# Patient Record
Sex: MALE | Race: WHITE | NOT HISPANIC OR LATINO | Employment: FULL TIME | URBAN - METROPOLITAN AREA
[De-identification: names, ages, dates, MRNs, and addresses within clinical notes are randomized per-mention and may not be internally consistent; named-entity substitution may affect disease eponyms.]

---

## 2019-07-08 ENCOUNTER — TELEPHONE (OUTPATIENT)
Dept: INTERNAL MEDICINE | Facility: CLINIC | Age: 34
End: 2019-07-08

## 2019-07-08 ENCOUNTER — OFFICE VISIT (OUTPATIENT)
Dept: INTERNAL MEDICINE | Facility: CLINIC | Age: 34
End: 2019-07-08
Payer: COMMERCIAL

## 2019-07-08 ENCOUNTER — HOSPITAL ENCOUNTER (OUTPATIENT)
Dept: RADIOLOGY | Facility: HOSPITAL | Age: 34
Discharge: HOME | End: 2019-07-08
Attending: FAMILY MEDICINE
Payer: COMMERCIAL

## 2019-07-08 VITALS
OXYGEN SATURATION: 97 % | HEART RATE: 80 BPM | DIASTOLIC BLOOD PRESSURE: 70 MMHG | TEMPERATURE: 98 F | SYSTOLIC BLOOD PRESSURE: 102 MMHG | HEIGHT: 69 IN | BODY MASS INDEX: 23.97 KG/M2 | WEIGHT: 161.81 LBS

## 2019-07-08 DIAGNOSIS — M54.2 NECK PAIN ON RIGHT SIDE: ICD-10-CM

## 2019-07-08 DIAGNOSIS — M25.511 ACUTE PAIN OF RIGHT SHOULDER: ICD-10-CM

## 2019-07-08 DIAGNOSIS — J02.9 EXUDATIVE PHARYNGITIS: Primary | ICD-10-CM

## 2019-07-08 DIAGNOSIS — J02.9 EXUDATIVE PHARYNGITIS: ICD-10-CM

## 2019-07-08 LAB
DEPRECATED S PYO AG THROAT QL EIA: NEGATIVE
INFLUENZA A, MOLECULAR: NEGATIVE
INFLUENZA B, MOLECULAR: NEGATIVE
SPECIMEN SOURCE: NORMAL

## 2019-07-08 PROCEDURE — 71046 X-RAY EXAM CHEST 2 VIEWS: CPT | Mod: 26,,, | Performed by: RADIOLOGY

## 2019-07-08 PROCEDURE — 87880 STREP A ASSAY W/OPTIC: CPT

## 2019-07-08 PROCEDURE — 99999 PR PBB SHADOW E&M-EST. PATIENT-LVL III: CPT | Mod: PBBFAC,,, | Performed by: FAMILY MEDICINE

## 2019-07-08 PROCEDURE — 3008F PR BODY MASS INDEX (BMI) DOCUMENTED: ICD-10-PCS | Mod: CPTII,S$GLB,, | Performed by: FAMILY MEDICINE

## 2019-07-08 PROCEDURE — 99214 OFFICE O/P EST MOD 30 MIN: CPT | Mod: S$GLB,,, | Performed by: FAMILY MEDICINE

## 2019-07-08 PROCEDURE — 72050 X-RAY EXAM NECK SPINE 4/5VWS: CPT | Mod: TC

## 2019-07-08 PROCEDURE — 99214 PR OFFICE/OUTPT VISIT, EST, LEVL IV, 30-39 MIN: ICD-10-PCS | Mod: S$GLB,,, | Performed by: FAMILY MEDICINE

## 2019-07-08 PROCEDURE — 73030 XR SHOULDER COMPLETE 2 OR MORE VIEWS RIGHT: ICD-10-PCS | Mod: 26,RT,, | Performed by: RADIOLOGY

## 2019-07-08 PROCEDURE — 73030 X-RAY EXAM OF SHOULDER: CPT | Mod: 26,RT,, | Performed by: RADIOLOGY

## 2019-07-08 PROCEDURE — 87502 INFLUENZA DNA AMP PROBE: CPT

## 2019-07-08 PROCEDURE — 72050 XR CERVICAL SPINE COMPLETE 5 VIEW: ICD-10-PCS | Mod: 26,,, | Performed by: RADIOLOGY

## 2019-07-08 PROCEDURE — 72050 X-RAY EXAM NECK SPINE 4/5VWS: CPT | Mod: 26,,, | Performed by: RADIOLOGY

## 2019-07-08 PROCEDURE — 73030 X-RAY EXAM OF SHOULDER: CPT | Mod: TC,RT

## 2019-07-08 PROCEDURE — 3008F BODY MASS INDEX DOCD: CPT | Mod: CPTII,S$GLB,, | Performed by: FAMILY MEDICINE

## 2019-07-08 PROCEDURE — 87081 CULTURE SCREEN ONLY: CPT

## 2019-07-08 PROCEDURE — 71046 XR CHEST PA AND LATERAL: ICD-10-PCS | Mod: 26,,, | Performed by: RADIOLOGY

## 2019-07-08 PROCEDURE — 71046 X-RAY EXAM CHEST 2 VIEWS: CPT | Mod: TC

## 2019-07-08 PROCEDURE — 99999 PR PBB SHADOW E&M-EST. PATIENT-LVL III: ICD-10-PCS | Mod: PBBFAC,,, | Performed by: FAMILY MEDICINE

## 2019-07-08 RX ORDER — IBUPROFEN 800 MG/1
800 TABLET ORAL 3 TIMES DAILY
Refills: 0 | COMMUNITY
Start: 2019-07-06

## 2019-07-08 RX ORDER — PREDNISONE 10 MG/1
TABLET ORAL
Qty: 18 TABLET | Refills: 0 | Status: SHIPPED | OUTPATIENT
Start: 2019-07-08

## 2019-07-08 NOTE — TELEPHONE ENCOUNTER
----- Message from Ritu Bradford sent at 7/8/2019  3:54 PM CDT -----  Contact: Pt can be reached at [phone]  Pt is waiting at the pharmacy for prescription for antibotics.  Pt is at [employer] phone [phone]    Thank you!

## 2019-07-08 NOTE — TELEPHONE ENCOUNTER
----- Message from Corinne Ortiz sent at 7/8/2019  9:29 AM CDT -----  Contact: self  Type:  Same Day Appointment    patient request Dr Machuca states seen Dr Machuca in the past at [facility]   Patient states experiencing neck and back pain need appointment for today.  Name of Caller:  Patient   When is the first available appointment?  Symptoms: neck and back pain  Would the patient rather a call back or a response via [facility]? call  Best Call Back Number:[phone]  Additional Information:

## 2019-07-08 NOTE — TELEPHONE ENCOUNTER
Pt states that he has been having shoulder and back pain. Went to urgent care clinic, received a steroid shot and passed out. He was also told he had the flu. He states that his shoulder is still bothering him, does not have any fever, sore throat a little. Would like to know what are  Some recommendations or does he need to be seen. Please advise.

## 2019-07-08 NOTE — PROGRESS NOTES
Darshan Gonzalez  07/08/2019  [id number]    Nick Machuca MD  Patient Care Team:  Nick Machuca MD as PCP - General (Family Medicine)        Chief Complaint:  Chief Complaint   Patient presents with    Neck Pain     radiates down shoulder and back       History of Present Illness:  This generally healthy 33-year-old male long-time patient of mine comes in today and says that a week ago he injured his right shoulder lifting weights but it improved over the next few days until he lifted a generator about 4 days ago and re-injured the right shoulder and also the right neck started hurting as well.  Two days ago it was very severe and he had to go to urgent care where they gave him 2 injections.  He thinks 1 was nor flex and and other 1 was Toradol.  He was sitting on the exam table after the injections and had a syncopal reaction associated with hypotension and hit his face and head and his laceration on the left forehead was glued.  The patient thinks he was fairly well-hydrated at the time and has never had any type of vasovagal faint before.    He has not had headaches since that time does not know of any lingering problem such as memory loss and his fever has abated in the last 24 hr.  He still has some soreness on the right neck and upper shoulder region and some stiffness in turning his neck to the right and in full extension of the neck but it is improved from a few days ago.  He does not have any radiation down the arm.  He does noticed painful sore throat on the right side and also swollen tender nodes on the right side of the neck.    He was told in the urgent care initially that he did not have a positive flu test but before he left he was told he did have a positive flu test and was given 2 pills to take at the time.    History:  History reviewed. No pertinent past medical history.  History reviewed. No pertinent surgical history.  Family History   Problem Relation Age of Onset    Mitral valve  prolapse Mother      Social History     Socioeconomic History    Marital status:      Spouse name: Not on file    Number of children: Not on file    Years of education: Not on file    Highest education level: Not on file   Occupational History    Not on file   Social Needs    Financial resource strain: Not on file    Food insecurity:     Worry: Not on file     Inability: Not on file    Transportation needs:     Medical: Not on file     Non-medical: Not on file   Tobacco Use    Smoking status: Never Smoker    Smokeless tobacco: Never Used   Substance and Sexual Activity    Alcohol use: Not Currently    Drug use: Not on file    Sexual activity: Not on file   Lifestyle    Physical activity:     Days per week: Not on file     Minutes per session: Not on file    Stress: Not on file   Relationships    Social connections:     Talks on phone: Not on file     Gets together: Not on file     Attends Sabianism service: Not on file     Active member of club or organization: Not on file     Attends meetings of clubs or organizations: Not on file     Relationship status: Not on file   Other Topics Concern    Not on file   Social History Narrative    Not on file     There is no problem list on file for this patient.    Review of patient's allergies indicates:   Allergen Reactions    Norflex [orphenadrine citrate]     Toradol [ketorolac]        The following were reviewed at this visit: active problem list, medication list, allergies, family history, social history, and health maintenance.    Medications:  Current Outpatient Medications on File Prior to Visit   Medication Sig Dispense Refill    ibuprofen (ADVIL,MOTRIN) 800 MG tablet Take 800 mg by mouth 3 (three) times daily.  0     No current facility-administered medications on file prior to visit.        Medications have been reviewed and reconciled with patient at this visit.      Exam:  Wt Readings from Last 3 Encounters:   07/08/19 73.4 kg (161 lb  13.1 oz)     Temp Readings from Last 3 Encounters:   07/08/19 97.8 °F (36.6 °C) (Tympanic)     BP Readings from Last 3 Encounters:   07/08/19 102/70     Pulse Readings from Last 3 Encounters:   07/08/19 80     Body mass index is 23.9 kg/m².      Review of Systems   Constitutional: Negative for chills, fever and weight loss.   HENT: Positive for sore throat.    Respiratory: Negative for shortness of breath.    Cardiovascular: Negative for chest pain and palpitations.   Musculoskeletal: Positive for neck pain.     Physical Exam the patient is alert and oriented well-nourished well-developed ambulatory and in no acute distress but does have a few healing abrasions on the left facial region and a larger 3 cm healing laceration of the left forehead.    HEENT-ear canals clear, TMs intact  Pharynx-heavy whitish exudate noted posterior pharynx    Neck-tender swollen right anterior cervical and submandibular nodes.  Neck rotation to the right is only  70° and the patient has only 30° of extension.    Right shoulder-good strength and good range of motion except for pain with full external rotation.  Empty can test is normal with good strength  Tenderness to palpation along the medial border of the scapula at the rhomboids and along the superior trapezius musculature and along the right paracervical musculature.   strength is good bilaterally    Discussion-the patient apparently had a muscle strain of the right shoulder a week ago and then re-injured it and also the right paracervical musculature.  Concomitantly over the past 4 days he has had some type of febrile illness including right pharyngeal exudate with lymphadenopathy.        Darshan was seen today for neck pain.    Diagnoses and all orders for this visit:    Exudative pharyngitis  -     X-Ray Chest PA And Lateral  -     X-Ray Cervical Spine Complete 5 view; Future  -     X-ray Shoulder 2 or More Views Right; Future  -     CBC auto differential; Future  -      Influenza A & B by Molecular  -     Throat Screen, Rapid  -     Heterophile antibody titer; Future    Acute pain of right shoulder  -     X-Ray Chest PA And Lateral  -     X-Ray Cervical Spine Complete 5 view; Future  -     X-ray Shoulder 2 or More Views Right; Future  -     CBC auto differential; Future  -     Influenza A & B by Molecular  -     Throat Screen, Rapid    Neck pain on right side  -     X-Ray Chest PA And Lateral  -     X-Ray Cervical Spine Complete 5 view; Future  -     X-ray Shoulder 2 or More Views Right; Future  -     CBC auto differential; Future  -     Influenza A & B by Molecular  -     Throat Screen, Rapid    Other orders  -     Strep A culture, throat                  Follow up: No follow-ups on file.    After visit summary was printed and given to patient upon discharge today.  Patient goals and care plan are included in After Visit Summary.

## 2019-07-09 ENCOUNTER — TELEPHONE (OUTPATIENT)
Dept: INTERNAL MEDICINE | Facility: CLINIC | Age: 34
End: 2019-07-09

## 2019-07-09 NOTE — TELEPHONE ENCOUNTER
----- Message from Josselyn Edwards sent at 7/9/2019  8:30 AM CDT -----  Contact: Pt  .Type:  Patient Returning Call    Who Called: Pt  Who Left Message for Patient: Nurse   Does the patient know what this is regarding?: return call   Would the patient rather a call back or a response via [facility]? Call back  Best Call Back Number: [phone]  Additional Information:

## 2019-07-10 ENCOUNTER — TELEPHONE (OUTPATIENT)
Dept: ORTHOPEDICS | Facility: CLINIC | Age: 34
End: 2019-07-10

## 2019-07-10 NOTE — TELEPHONE ENCOUNTER
Attempted to reach out to the patient in reference to his CBC results per Dr. Machuca. No answer. Left voice message.//DG      ----- Message from Nick Machuca MD sent at 7/9/2019  2:19 PM CDT -----  CBC is fine-no significant anemia or indication of severe infection

## 2019-07-11 ENCOUNTER — TELEPHONE (OUTPATIENT)
Dept: INTERNAL MEDICINE | Facility: CLINIC | Age: 34
End: 2019-07-11

## 2019-07-11 LAB — BACTERIA THROAT CULT: NORMAL

## 2019-07-11 NOTE — TELEPHONE ENCOUNTER
----- Message from Eileen Jackson sent at 7/11/2019  9:40 AM CDT -----  Contact: Pt  Pt missed a call from the Dr's Office and would like a return call from the nurse at [phone]

## 2019-09-10 ENCOUNTER — OFFICE VISIT (OUTPATIENT)
Dept: INTERNAL MEDICINE | Facility: CLINIC | Age: 34
End: 2019-09-10
Payer: COMMERCIAL

## 2019-09-10 ENCOUNTER — LAB VISIT (OUTPATIENT)
Dept: LAB | Facility: HOSPITAL | Age: 34
End: 2019-09-10
Attending: FAMILY MEDICINE
Payer: COMMERCIAL

## 2019-09-10 VITALS
HEIGHT: 69 IN | OXYGEN SATURATION: 97 % | TEMPERATURE: 97 F | DIASTOLIC BLOOD PRESSURE: 70 MMHG | HEART RATE: 88 BPM | WEIGHT: 162.06 LBS | BODY MASS INDEX: 24 KG/M2 | SYSTOLIC BLOOD PRESSURE: 108 MMHG

## 2019-09-10 DIAGNOSIS — F41.9 ANXIETY: ICD-10-CM

## 2019-09-10 DIAGNOSIS — F41.0 PANIC DISORDER: ICD-10-CM

## 2019-09-10 DIAGNOSIS — F41.9 ANXIETY: Primary | ICD-10-CM

## 2019-09-10 DIAGNOSIS — J02.9 EXUDATIVE PHARYNGITIS: ICD-10-CM

## 2019-09-10 LAB
ALBUMIN SERPL BCP-MCNC: 4.2 G/DL (ref 3.5–5.2)
ALP SERPL-CCNC: 69 U/L (ref 55–135)
ALT SERPL W/O P-5'-P-CCNC: 21 U/L (ref 10–44)
ANION GAP SERPL CALC-SCNC: 10 MMOL/L (ref 8–16)
AST SERPL-CCNC: 23 U/L (ref 10–40)
BASOPHILS # BLD AUTO: 0.06 K/UL (ref 0–0.2)
BASOPHILS NFR BLD: 0.9 % (ref 0–1.9)
BILIRUB SERPL-MCNC: 0.5 MG/DL (ref 0.1–1)
BUN SERPL-MCNC: 12 MG/DL (ref 6–20)
CALCIUM SERPL-MCNC: 9.6 MG/DL (ref 8.7–10.5)
CHLORIDE SERPL-SCNC: 101 MMOL/L (ref 95–110)
CHOLEST SERPL-MCNC: 154 MG/DL (ref 120–199)
CHOLEST/HDLC SERPL: 2.2 {RATIO} (ref 2–5)
CO2 SERPL-SCNC: 28 MMOL/L (ref 23–29)
CREAT SERPL-MCNC: 1.3 MG/DL (ref 0.5–1.4)
DIFFERENTIAL METHOD: ABNORMAL
EOSINOPHIL # BLD AUTO: 0.7 K/UL (ref 0–0.5)
EOSINOPHIL NFR BLD: 9.6 % (ref 0–8)
ERYTHROCYTE [DISTWIDTH] IN BLOOD BY AUTOMATED COUNT: 12.1 % (ref 11.5–14.5)
EST. GFR  (AFRICAN AMERICAN): >60 ML/MIN/1.73 M^2
EST. GFR  (NON AFRICAN AMERICAN): >60 ML/MIN/1.73 M^2
GLUCOSE SERPL-MCNC: 91 MG/DL (ref 70–110)
HCT VFR BLD AUTO: 44.2 % (ref 40–54)
HDLC SERPL-MCNC: 69 MG/DL (ref 40–75)
HDLC SERPL: 44.8 % (ref 20–50)
HGB BLD-MCNC: 14.5 G/DL (ref 14–18)
IMM GRANULOCYTES # BLD AUTO: 0.03 K/UL (ref 0–0.04)
IMM GRANULOCYTES NFR BLD AUTO: 0.4 % (ref 0–0.5)
LDLC SERPL CALC-MCNC: 73.8 MG/DL (ref 63–159)
LYMPHOCYTES # BLD AUTO: 2.1 K/UL (ref 1–4.8)
LYMPHOCYTES NFR BLD: 31.1 % (ref 18–48)
MCH RBC QN AUTO: 29 PG (ref 27–31)
MCHC RBC AUTO-ENTMCNC: 32.8 G/DL (ref 32–36)
MCV RBC AUTO: 88 FL (ref 82–98)
MONOCYTES # BLD AUTO: 0.6 K/UL (ref 0.3–1)
MONOCYTES NFR BLD: 8.7 % (ref 4–15)
NEUTROPHILS # BLD AUTO: 3.4 K/UL (ref 1.8–7.7)
NEUTROPHILS NFR BLD: 49.3 % (ref 38–73)
NONHDLC SERPL-MCNC: 85 MG/DL
NRBC BLD-RTO: 0 /100 WBC
PLATELET # BLD AUTO: 279 K/UL (ref 150–350)
PMV BLD AUTO: 10.1 FL (ref 9.2–12.9)
POTASSIUM SERPL-SCNC: 4.3 MMOL/L (ref 3.5–5.1)
PROT SERPL-MCNC: 7.3 G/DL (ref 6–8.4)
RBC # BLD AUTO: 5 M/UL (ref 4.6–6.2)
SODIUM SERPL-SCNC: 139 MMOL/L (ref 136–145)
TRIGL SERPL-MCNC: 56 MG/DL (ref 30–150)
TSH SERPL DL<=0.005 MIU/L-ACNC: 0.62 UIU/ML (ref 0.4–4)
WBC # BLD AUTO: 6.79 K/UL (ref 3.9–12.7)

## 2019-09-10 PROCEDURE — 99999 PR PBB SHADOW E&M-EST. PATIENT-LVL III: CPT | Mod: PBBFAC,,, | Performed by: FAMILY MEDICINE

## 2019-09-10 PROCEDURE — 36415 COLL VENOUS BLD VENIPUNCTURE: CPT

## 2019-09-10 PROCEDURE — 3008F BODY MASS INDEX DOCD: CPT | Mod: CPTII,S$GLB,, | Performed by: FAMILY MEDICINE

## 2019-09-10 PROCEDURE — 99214 PR OFFICE/OUTPT VISIT, EST, LEVL IV, 30-39 MIN: ICD-10-PCS | Mod: S$GLB,,, | Performed by: FAMILY MEDICINE

## 2019-09-10 PROCEDURE — 99999 PR PBB SHADOW E&M-EST. PATIENT-LVL III: ICD-10-PCS | Mod: PBBFAC,,, | Performed by: FAMILY MEDICINE

## 2019-09-10 PROCEDURE — 99214 OFFICE O/P EST MOD 30 MIN: CPT | Mod: S$GLB,,, | Performed by: FAMILY MEDICINE

## 2019-09-10 PROCEDURE — 3008F PR BODY MASS INDEX (BMI) DOCUMENTED: ICD-10-PCS | Mod: CPTII,S$GLB,, | Performed by: FAMILY MEDICINE

## 2019-09-10 PROCEDURE — 84443 ASSAY THYROID STIM HORMONE: CPT

## 2019-09-10 PROCEDURE — 85025 COMPLETE CBC W/AUTO DIFF WBC: CPT

## 2019-09-10 PROCEDURE — 80053 COMPREHEN METABOLIC PANEL: CPT

## 2019-09-10 PROCEDURE — 86308 HETEROPHILE ANTIBODY SCREEN: CPT

## 2019-09-10 PROCEDURE — 80061 LIPID PANEL: CPT

## 2019-09-10 RX ORDER — ESCITALOPRAM OXALATE 10 MG/1
TABLET ORAL
Qty: 30 TABLET | Refills: 1 | Status: SHIPPED | OUTPATIENT
Start: 2019-09-10 | End: 2019-11-04 | Stop reason: SDUPTHER

## 2019-09-10 NOTE — PATIENT INSTRUCTIONS
Stress management techniques    Start taking Lexapro 1/2 each evening until return from vacation and then increase to 1 tab each evening    Work on increasing hobby time    Get labs done fasting

## 2019-09-10 NOTE — PROGRESS NOTES
Darshan Gonzalez  09/10/2019  [id number]    Nick Machuca MD  Patient Care Team:  Nick Machuca MD as PCP - General (Family Medicine)        Chief Complaint:  Chief Complaint   Patient presents with    Anxiety     been going on for awhile now. he has already seeked help as far as therapy and no real change       History of Present Illness:   Darshan Gonzalez 33 y.o. male presents today with history of long-term anxiety which has been worsening over the last year .  He states that he remembers having anxiety growing up and particularly in high school but he was able to control it by working out and exercising.    He states however over the last year it has become more troublesome and he worries a lot about his wife and his child and feels like he is unable to control things from a safety standpoint.  He exercises 4-5 days a week but says he even gets anxious about his workouts and especially if he has to miss 1.  He has a job which  is fairly sedentary and he gets very agitated at times because he cannot move around very much.    He was referred to a psychiatrist or psychologist several months ago and says that he went for about 8 visits and learned stress management techniques and was told he might have  OCD but does not feel that this overall improved his situation very much.    The patient has good insight and realized a few months ago when he started having nonexertional chest pain and panic attack that it might be because his mother  at his age of a sudden cardiac cardiovascular event.  He saw cardiologist had evaluations done which were negative.    The patient comes in today asking for help with management of this problem.      History:  History reviewed. No pertinent past medical history.  History reviewed. No pertinent surgical history.  Family History   Problem Relation Age of Onset    Mitral valve prolapse Mother      Social History     Socioeconomic History    Marital status:       Spouse name: Not on file    Number of children: Not on file    Years of education: Not on file    Highest education level: Not on file   Occupational History    Not on file   Social Needs    Financial resource strain: Not on file    Food insecurity:     Worry: Not on file     Inability: Not on file    Transportation needs:     Medical: Not on file     Non-medical: Not on file   Tobacco Use    Smoking status: Never Smoker    Smokeless tobacco: Never Used   Substance and Sexual Activity    Alcohol use: Not Currently    Drug use: Not on file    Sexual activity: Not on file   Lifestyle    Physical activity:     Days per week: Not on file     Minutes per session: Not on file    Stress: Not on file   Relationships    Social connections:     Talks on phone: Not on file     Gets together: Not on file     Attends Advent service: Not on file     Active member of club or organization: Not on file     Attends meetings of clubs or organizations: Not on file     Relationship status: Not on file   Other Topics Concern    Not on file   Social History Narrative    Not on file     Patient Active Problem List   Diagnosis    Anxiety    Panic disorder     Review of patient's allergies indicates:   Allergen Reactions    Norflex [orphenadrine citrate]     Toradol [ketorolac]        The following were reviewed at this visit: active problem list, medication list, allergies, family history, social history, and health maintenance.    Medications:  Current Outpatient Medications on File Prior to Visit   Medication Sig Dispense Refill    ibuprofen (ADVIL,MOTRIN) 800 MG tablet Take 800 mg by mouth 3 (three) times daily.  0    predniSONE (DELTASONE) 10 MG tablet Three per day for 3 days, then 2 per day for 3 days, then 1 per day for 3 more days. 18 tablet 0     No current facility-administered medications on file prior to visit.        Medications have been reviewed and reconciled with patient at this  visit.      Exam:  Wt Readings from Last 3 Encounters:   09/10/19 73.5 kg (162 lb 0.6 oz)   07/08/19 73.4 kg (161 lb 13.1 oz)     Temp Readings from Last 3 Encounters:   09/10/19 96.8 °F (36 °C) (Tympanic)   07/08/19 97.8 °F (36.6 °C) (Tympanic)     BP Readings from Last 3 Encounters:   09/10/19 108/70   07/08/19 102/70     Pulse Readings from Last 3 Encounters:   09/10/19 88   07/08/19 80     Body mass index is 23.93 kg/m².      Review of Systems   Constitutional: Negative for chills, fever and weight loss.   Respiratory: Negative for shortness of breath.    Cardiovascular: Negative for chest pain and palpitations.     Physical Exam  WNWD, A&O ambulatory pleasant adult male and in no acute distress    Heart regular rate and rhythm without murmur  Lungs clear  Abdomen soft nontender  Neuro grossly intact  Psychiatric-good affect cognition  Visibly anxious at times with hand wringing and picking.    The patient verbalizes good understanding of his diagnosis of anxiety and the possibility of OCD and of the general management of this problem and agrees with our plan and also indicates a appreciation of the care provided today.      Darshan was seen today for anxiety.    Diagnoses and all orders for this visit:    Anxiety  -     CBC auto differential; Future  -     Comprehensive metabolic panel; Future  -     Lipid panel; Future  -     TSH; Future    Panic disorder    Other orders  -     escitalopram oxalate (LEXAPRO) 10 MG tablet; Take 1/2 tab each evening for the 1st week or 2 until returns from vacation and then start taking 1 tab each evening.          I discussed the nature of the problem(s) with the patient today.  The patient was encouraged to participate in appropriate physical activity.    After visit summary was printed and given to patient upon discharge today.  Patient goals and care plan are included in After Visit Summary.

## 2019-09-13 ENCOUNTER — TELEPHONE (OUTPATIENT)
Dept: ORTHOPEDICS | Facility: CLINIC | Age: 34
End: 2019-09-13

## 2019-09-13 NOTE — TELEPHONE ENCOUNTER
Spoke w/ patient and let patient know that Dr. Machuca said labs are very good-I look for to seeing him for recheck next month. Patient verbalized understanding and was grateful- DD             ----- Message from Nick Machuca MD sent at 9/11/2019 10:11 AM CDT -----  Labs are very good-I look for to seeing him for recheck next month.

## 2019-09-15 LAB — HETEROPH AB SER QL LA: NEGATIVE

## 2019-09-30 ENCOUNTER — PATIENT OUTREACH (OUTPATIENT)
Dept: ADMINISTRATIVE | Facility: HOSPITAL | Age: 34
End: 2019-09-30

## 2019-11-04 ENCOUNTER — OFFICE VISIT (OUTPATIENT)
Dept: INTERNAL MEDICINE | Facility: CLINIC | Age: 34
End: 2019-11-04
Payer: COMMERCIAL

## 2019-11-04 VITALS
SYSTOLIC BLOOD PRESSURE: 110 MMHG | BODY MASS INDEX: 24.78 KG/M2 | OXYGEN SATURATION: 99 % | HEIGHT: 69 IN | HEART RATE: 86 BPM | TEMPERATURE: 98 F | DIASTOLIC BLOOD PRESSURE: 66 MMHG | WEIGHT: 167.31 LBS

## 2019-11-04 DIAGNOSIS — F41.9 ANXIETY: ICD-10-CM

## 2019-11-04 DIAGNOSIS — N52.9 ERECTILE DYSFUNCTION, UNSPECIFIED ERECTILE DYSFUNCTION TYPE: Primary | ICD-10-CM

## 2019-11-04 PROCEDURE — 3008F BODY MASS INDEX DOCD: CPT | Mod: CPTII,S$GLB,, | Performed by: FAMILY MEDICINE

## 2019-11-04 PROCEDURE — 99214 OFFICE O/P EST MOD 30 MIN: CPT | Mod: S$GLB,,, | Performed by: FAMILY MEDICINE

## 2019-11-04 PROCEDURE — 99999 PR PBB SHADOW E&M-EST. PATIENT-LVL III: ICD-10-PCS | Mod: PBBFAC,,, | Performed by: FAMILY MEDICINE

## 2019-11-04 PROCEDURE — 99214 PR OFFICE/OUTPT VISIT, EST, LEVL IV, 30-39 MIN: ICD-10-PCS | Mod: S$GLB,,, | Performed by: FAMILY MEDICINE

## 2019-11-04 PROCEDURE — 3008F PR BODY MASS INDEX (BMI) DOCUMENTED: ICD-10-PCS | Mod: CPTII,S$GLB,, | Performed by: FAMILY MEDICINE

## 2019-11-04 PROCEDURE — 99999 PR PBB SHADOW E&M-EST. PATIENT-LVL III: CPT | Mod: PBBFAC,,, | Performed by: FAMILY MEDICINE

## 2019-11-04 RX ORDER — BUSPIRONE HYDROCHLORIDE 7.5 MG/1
TABLET ORAL
Qty: 30 TABLET | Refills: 2 | Status: SHIPPED | OUTPATIENT
Start: 2019-11-04

## 2019-11-04 RX ORDER — TADALAFIL 20 MG/1
20 TABLET ORAL DAILY PRN
Qty: 10 TABLET | Refills: 11 | Status: SHIPPED | OUTPATIENT
Start: 2019-11-04 | End: 2020-11-09 | Stop reason: SDUPTHER

## 2019-11-04 RX ORDER — ESCITALOPRAM OXALATE 10 MG/1
TABLET ORAL
Qty: 30 TABLET | Refills: 5 | Status: SHIPPED | OUTPATIENT
Start: 2019-11-04 | End: 2019-11-04

## 2019-11-04 RX ORDER — ESCITALOPRAM OXALATE 10 MG/1
10 TABLET ORAL DAILY
Qty: 30 TABLET | Refills: 11 | Status: SHIPPED | OUTPATIENT
Start: 2019-11-04 | End: 2020-07-29

## 2019-11-04 NOTE — PROGRESS NOTES
Darshan Gonzalez  11/04/2019  [id number]    Nick Machuca MD  Patient Care Team:  Nick Machuca MD as PCP - General (Family Medicine)        Chief Complaint:  Chief Complaint   Patient presents with    4 week followup       History of Present Illness:   Darshan Gonzalez 33 y.o. male presents today with history of Lexapro 10 mg helping reduce his anxiety and feelings of stress over the past month.  He said he does not have the feeling of pressure in his chest anymore and notices that he enjoys is workouts and time with his family more without being  stressed.    He recently completed a nice bow hunting trip to [place].  He states that his sleep continues to be good and his energy level is good.    He does however note that he has had a few episodes of e.d. regarding problems with sustained performance and he has had a few episodes over the last year previous to the Lexapro but seems to be slightly worse now and he would like to try Cial;is.      History:  History reviewed. No pertinent past medical history.  History reviewed. No pertinent surgical history.  Family History   Problem Relation Age of Onset    Mitral valve prolapse Mother      Social History     Socioeconomic History    Marital status:      Spouse name: Not on file    Number of children: Not on file    Years of education: Not on file    Highest education level: Not on file   Occupational History    Not on file   Social Needs    Financial resource strain: Not on file    Food insecurity:     Worry: Not on file     Inability: Not on file    Transportation needs:     Medical: Not on file     Non-medical: Not on file   Tobacco Use    Smoking status: Never Smoker    Smokeless tobacco: Never Used   Substance and Sexual Activity    Alcohol use: Not Currently    Drug use: Not on file    Sexual activity: Not on file   Lifestyle    Physical activity:     Days per week: Not on file     Minutes per session: Not on file    Stress: Not on  file   Relationships    Social connections:     Talks on phone: Not on file     Gets together: Not on file     Attends Hindu service: Not on file     Active member of club or organization: Not on file     Attends meetings of clubs or organizations: Not on file     Relationship status: Not on file   Other Topics Concern    Not on file   Social History Narrative    Not on file     Patient Active Problem List   Diagnosis    Anxiety    Panic disorder    Erectile dysfunction     Review of patient's allergies indicates:   Allergen Reactions    Norflex [orphenadrine citrate]     Toradol [ketorolac]        The following were reviewed at this visit: active problem list, medication list, allergies, family history, social history, and health maintenance.    Medications:  Current Outpatient Medications on File Prior to Visit   Medication Sig Dispense Refill    [DISCONTINUED] escitalopram oxalate (LEXAPRO) 10 MG tablet Take 1/2 tab each evening for the 1st week or 2 until returns from vacation and then start taking 1 tab each evening. 30 tablet 1    ibuprofen (ADVIL,MOTRIN) 800 MG tablet Take 800 mg by mouth 3 (three) times daily.  0    predniSONE (DELTASONE) 10 MG tablet Three per day for 3 days, then 2 per day for 3 days, then 1 per day for 3 more days. (Patient not taking: Reported on 11/4/2019) 18 tablet 0     No current facility-administered medications on file prior to visit.        Medications have been reviewed and reconciled with patient at this visit.      Exam:  Wt Readings from Last 3 Encounters:   11/04/19 75.9 kg (167 lb 5.3 oz)   09/10/19 73.5 kg (162 lb 0.6 oz)   07/08/19 73.4 kg (161 lb 13.1 oz)     Temp Readings from Last 3 Encounters:   11/04/19 97.6 °F (36.4 °C) (Tympanic)   09/10/19 96.8 °F (36 °C) (Tympanic)   07/08/19 97.8 °F (36.6 °C) (Tympanic)     BP Readings from Last 3 Encounters:   11/04/19 110/66   09/10/19 108/70   07/08/19 102/70     Pulse Readings from Last 3 Encounters:   11/04/19  86   09/10/19 88   07/08/19 80     Body mass index is 24.71 kg/m².      Review of Systems   Constitutional: Negative for chills, fever and weight loss.   Respiratory: Negative for shortness of breath.    Cardiovascular: Negative for chest pain and palpitations.   Psychiatric/Behavioral: The patient is nervous/anxious.      Physical Exam   Constitutional: He is oriented to person, place, and time. He appears well-developed and well-nourished. No distress.   HENT:   Head: Normocephalic and atraumatic.   Eyes: Pupils are equal, round, and reactive to light. EOM are normal. Right eye exhibits no discharge. Left eye exhibits no discharge.   Neck: Normal range of motion. Neck supple. No thyromegaly present.   Cardiovascular: Normal rate, regular rhythm and normal heart sounds. Exam reveals no gallop and no friction rub.   No murmur heard.  Pulmonary/Chest: Effort normal and breath sounds normal. No respiratory distress. He has no wheezes. He has no rales.   Abdominal: Soft. He exhibits no distension. There is no tenderness.   Musculoskeletal: Normal range of motion.   Neurological: He is alert and oriented to person, place, and time.   Psychiatric: He has a normal mood and affect.   Vitals reviewed.    WNWD, A&O------ has any side effects or is possibly helpful for his daytime anxiety  During conversation mild anxiety evident through occasional extra deep breaths and change in rhythm breathing.  Very pleasant demeanor and positive affect.    Verbalizes good understanding of the issues discussed in appreciation for the care today.  At my suggestion he agrees to try BuSpar on a p.r.n. basis to see if it      Darshan was seen today for 4 week followup.    Diagnoses and all orders for this visit:    Erectile dysfunction, unspecified erectile dysfunction type    Anxiety    Other orders  -     tadalafil (CIALIS) 20 MG Tab; Take 1 tablet (20 mg total) by mouth daily as needed.  -     Discontinue: escitalopram oxalate (LEXAPRO)  10 MG tablet; Take 1 tablet by mouth every evening  -     escitalopram oxalate (LEXAPRO) 10 MG tablet; Take 1 tablet (10 mg total) by mouth once daily.  -     busPIRone (BUSPAR) 7.5 MG tablet; One tab with food twice a day as needed for stress or anxiety.          I discussed the nature of the problem(s) with the patient today.  The patient was encouraged to participate in appropriate physical activity.    After visit summary was printed and given to patient upon discharge today.  Patient goals and care plan are included in After Visit Summary.    This note was produced with voice recognition software and may have sound a like errors

## 2020-05-18 ENCOUNTER — OFFICE VISIT (OUTPATIENT)
Dept: INTERNAL MEDICINE | Facility: CLINIC | Age: 35
End: 2020-05-18
Payer: COMMERCIAL

## 2020-05-18 VITALS
RESPIRATION RATE: 18 BRPM | HEIGHT: 69 IN | SYSTOLIC BLOOD PRESSURE: 110 MMHG | BODY MASS INDEX: 24.78 KG/M2 | WEIGHT: 167.31 LBS | OXYGEN SATURATION: 97 % | HEART RATE: 81 BPM | TEMPERATURE: 98 F | DIASTOLIC BLOOD PRESSURE: 80 MMHG

## 2020-05-18 DIAGNOSIS — L08.9 BLISTER OF LIP WITH INFECTION, INITIAL ENCOUNTER: Primary | ICD-10-CM

## 2020-05-18 DIAGNOSIS — S00.521A BLISTER OF LIP WITH INFECTION, INITIAL ENCOUNTER: Primary | ICD-10-CM

## 2020-05-18 PROCEDURE — 99999 PR PBB SHADOW E&M-EST. PATIENT-LVL IV: CPT | Mod: PBBFAC,,, | Performed by: FAMILY MEDICINE

## 2020-05-18 PROCEDURE — 3008F BODY MASS INDEX DOCD: CPT | Mod: CPTII,S$GLB,, | Performed by: FAMILY MEDICINE

## 2020-05-18 PROCEDURE — 99214 PR OFFICE/OUTPT VISIT, EST, LEVL IV, 30-39 MIN: ICD-10-PCS | Mod: S$GLB,,, | Performed by: FAMILY MEDICINE

## 2020-05-18 PROCEDURE — 3008F PR BODY MASS INDEX (BMI) DOCUMENTED: ICD-10-PCS | Mod: CPTII,S$GLB,, | Performed by: FAMILY MEDICINE

## 2020-05-18 PROCEDURE — 99214 OFFICE O/P EST MOD 30 MIN: CPT | Mod: S$GLB,,, | Performed by: FAMILY MEDICINE

## 2020-05-18 PROCEDURE — 99999 PR PBB SHADOW E&M-EST. PATIENT-LVL IV: ICD-10-PCS | Mod: PBBFAC,,, | Performed by: FAMILY MEDICINE

## 2020-05-18 RX ORDER — SULFAMETHOXAZOLE AND TRIMETHOPRIM 800; 160 MG/1; MG/1
TABLET ORAL
Qty: 30 TABLET | Refills: 0 | Status: SHIPPED | OUTPATIENT
Start: 2020-05-18

## 2020-05-18 RX ORDER — VALACYCLOVIR HYDROCHLORIDE 1 G/1
1000 TABLET, FILM COATED ORAL 2 TIMES DAILY
Qty: 14 TABLET | Refills: 1 | Status: SHIPPED | OUTPATIENT
Start: 2020-05-18 | End: 2021-05-18

## 2020-05-18 NOTE — PROGRESS NOTES
Darshan Gonzalez  05/18/2020  [id number]    Nick Machuca MD  Patient Care Team:  Nick Machuca MD as PCP - General (Family Medicine)        Chief Complaint:  Chief Complaint   Patient presents with    Infection     in mouth       History of Present Illness:   Darshan Gonzalez 34 y.o. male  states that 3 days ago he noticed a pimple just underneath his bottom lip or it was possibly a small skin ulceration.  He applied pressure to the area and obtained a tiny amount of discharge but it also caused swelling of his lower lip which has continued to be very swollen although he went to urgent care twice this week and and obtained cortisone injection as well as clindamycin and has been on Bactrim for 2 days now.    He states that the lower lip is sore but not extremely painful and he has not noticed any swelling spreading from the lower lip region.  No problems with his gums or oral cavity in no sore throat no fever.  No recent chills weight loss sweats shortness of breath or chest pain.  He has applied warm compresses but to no avail.    No problems with swallowing or tightness of the throat or chest.  Patient states that other than the swollen lower lip he feels fairly normal and not sick in a generalized way.      History:  History reviewed. No pertinent past medical history.  History reviewed. No pertinent surgical history.  Family History   Problem Relation Age of Onset    Mitral valve prolapse Mother      Social History     Socioeconomic History    Marital status:      Spouse name: Not on file    Number of children: Not on file    Years of education: Not on file    Highest education level: Not on file   Occupational History    Not on file   Social Needs    Financial resource strain: Not on file    Food insecurity:     Worry: Not on file     Inability: Not on file    Transportation needs:     Medical: Not on file     Non-medical: Not on file   Tobacco Use    Smoking status: Never Smoker     Smokeless tobacco: Never Used   Substance and Sexual Activity    Alcohol use: Not Currently    Drug use: Not on file    Sexual activity: Not on file   Lifestyle    Physical activity:     Days per week: Not on file     Minutes per session: Not on file    Stress: Not on file   Relationships    Social connections:     Talks on phone: Not on file     Gets together: Not on file     Attends [employer] service: Not on file     Active member of club or organization: Not on file     Attends meetings of clubs or organizations: Not on file     Relationship status: Not on file   Other Topics Concern    Not on file   Social History Narrative    Not on file     Patient Active Problem List   Diagnosis    Anxiety    Panic disorder    Erectile dysfunction    Blister of lip with infection     Review of patient's allergies indicates:   Allergen Reactions    Norflex [orphenadrine citrate]     Toradol [ketorolac]        The following were reviewed at this visit: active problem list, medication list, allergies, family history, social history, and health maintenance.    Medications:  Current Outpatient Medications on File Prior to Visit   Medication Sig Dispense Refill    escitalopram oxalate (LEXAPRO) 10 MG tablet Take 1 tablet (10 mg total) by mouth once daily. 30 tablet 11    ibuprofen (ADVIL,MOTRIN) 800 MG tablet Take 800 mg by mouth 3 (three) times daily.  0    busPIRone (BUSPAR) 7.5 MG tablet One tab with food twice a day as needed for stress or anxiety. (Patient not taking: Reported on 5/18/2020) 30 tablet 2    predniSONE (DELTASONE) 10 MG tablet Three per day for 3 days, then 2 per day for 3 days, then 1 per day for 3 more days. (Patient not taking: Reported on 11/4/2019) 18 tablet 0    tadalafil (CIALIS) 20 MG Tab Take 1 tablet (20 mg total) by mouth daily as needed. (Patient not taking: Reported on 5/18/2020) 10 tablet 11     No current facility-administered medications on file prior to visit.         Medications have been reviewed and reconciled with patient at this visit.      Exam:  Wt Readings from Last 3 Encounters:   05/18/20 75.9 kg (167 lb 5.3 oz)   11/04/19 75.9 kg (167 lb 5.3 oz)   09/10/19 73.5 kg (162 lb 0.6 oz)     Temp Readings from Last 3 Encounters:   05/18/20 98.4 °F (36.9 °C) (Tympanic)   11/04/19 97.6 °F (36.4 °C) (Tympanic)   09/10/19 96.8 °F (36 °C) (Tympanic)     BP Readings from Last 3 Encounters:   05/18/20 110/80   11/04/19 110/66   09/10/19 108/70     Pulse Readings from Last 3 Encounters:   05/18/20 81   11/04/19 86   09/10/19 88     Body mass index is 24.71 kg/m².      Review of Systems   Constitutional: Negative for chills, fever and weight loss.   Respiratory: Negative for shortness of breath.    Cardiovascular: Negative for chest pain and palpitations.     Physical Exam  WNWD, A&O ambulatory pleasant adult male in no acute distress    HEENT-pharynx benign an gingiva appears normal    No ocular or discharge from the nostrils    Lower lip 3+ edema but no abscess evident and nontender to palpation    Respiratory effort is normal    Psychiatric good affect and cognition    Discussion-in case this is of no unusual bacterial infection we will double the dosage of his Bactrim.  He was given a prescription for p.o. prednisone from urgent care which I have recommended he go ahead and start taking.    Hang case this is more of viral related atypical fever blister we will also place him on Valtrex.    He knows to contact us if he has any swelling in the throat her shortness of breath or fever nausea vomiting or other changes, otherwise we will recheck him in the office in 3 days and hopefully he will have improvement by then.    25 min spent face-to-face with patient over 50% that time in consultation regarding proper usage of medication and avoidance of spreading infection to the rest of his family and exacerbating the swelling.  Darshan was seen today for infection.    Diagnoses and  all orders for this visit:    Blister of lip with infection, initial encounter    Other orders  -     valACYclovir (VALTREX) 1000 MG tablet; Take 1 tablet (1,000 mg total) by mouth 2 (two) times daily.  -     sulfamethoxazole-trimethoprim 800-160mg (BACTRIM DS) 800-160 mg Tab; Take 2 tablets twice a day, total of 4 per day.        The patient verbalized good understanding of the medical issues discussed today and expressed appreciation for the care provided.  Patient was given the opportunity to ask questions and be an active participant in their medical care. Patient had no further questions or concerns at this time.   The patient was encouraged to participate in appropriate physical activity.    After visit summary was printed and given to patient upon discharge today.  Patient goals and care plan are included in After Visit Summary.    This note was produced with voice recognition software and may have sound a like errors

## 2020-05-18 NOTE — PATIENT INSTRUCTIONS
Valtrex twice a day    Bactrim 2 pills twice a day    Prednisone as directed    Ibuprofen as needed for pain

## 2020-05-19 ENCOUNTER — NURSE TRIAGE (OUTPATIENT)
Dept: ADMINISTRATIVE | Facility: CLINIC | Age: 35
End: 2020-05-19

## 2020-05-19 ENCOUNTER — TELEPHONE (OUTPATIENT)
Dept: INTERNAL MEDICINE | Facility: CLINIC | Age: 35
End: 2020-05-19

## 2020-05-19 NOTE — TELEPHONE ENCOUNTER
----- Message from Cristopher Costello sent at 5/19/2020 11:06 AM CDT -----  Type:  Needs Medical Advice    Who Called: Carmen/wife  Reason: The [facility] is not covered so he needs to go to [facility] on [address]  Would the patient rather a call back or a response via [facility]?   Best Call Back Number:  [phone]  Additional Information:  She would also like to speak to the nurse.

## 2020-05-19 NOTE — TELEPHONE ENCOUNTER
Pt has been seen 3 times in the last several days for an infection in his lower lip, but feels it's not getting any better.  She states it's about to tear open due to severe swelling.  He's on abx, antiviral, ibuprofen, and steroids already, and not getting any better.  He is afebrile, but in severe pain.  Alerted Deepa triage RN at [facility] ED and messaged Dr. Machuca.    Reason for Disposition   [1] Cellulitis AND [2] taking an antibiotic   SEVERE pain    Additional Information   Negative: Severe difficulty breathing (e.g., struggling for each breath, speaks in single words)   Negative: Sounds like a life-threatening emergency to the triager   Negative: [1] Recent hospitalization for pneumonia AND [2] taking an antibiotic   Negative: [1] Animal bite infection AND [2] taking an antibiotic   Negative: Shock suspected (e.g., cold/pale/clammy skin, too weak to stand, low BP, rapid pulse)   Negative: Sounds like a life-threatening emergency to the triager   Negative:  surgical wound infection suspected (post-op)   Negative: Surgical wound infection suspected (post-op)   Negative: [1] Widespread rash AND [2] drug rash suspected (i.e., allergic reaction to antibiotic)   Negative: Animal bite wound infection suspected    Protocols used: INFECTION ON ANTIBIOTIC FOLLOW-UP CALL-A-AH, CELLULITIS ON ANTIBIOTIC FOLLOW-UP CALL-A-AH

## 2020-05-19 NOTE — TELEPHONE ENCOUNTER
Spoke with patient's wife about lip infection he's had. Since in office visit they feel the medication made it worse. Wife is bringing him to the [facility] ER.

## 2020-05-21 ENCOUNTER — OFFICE VISIT (OUTPATIENT)
Dept: INTERNAL MEDICINE | Facility: CLINIC | Age: 35
End: 2020-05-21
Payer: COMMERCIAL

## 2020-05-21 VITALS
RESPIRATION RATE: 18 BRPM | OXYGEN SATURATION: 96 % | DIASTOLIC BLOOD PRESSURE: 80 MMHG | BODY MASS INDEX: 24.78 KG/M2 | WEIGHT: 167.31 LBS | SYSTOLIC BLOOD PRESSURE: 114 MMHG | HEIGHT: 69 IN | TEMPERATURE: 98 F | HEART RATE: 70 BPM

## 2020-05-21 DIAGNOSIS — L08.9 BLISTER OF LIP WITH INFECTION, SUBSEQUENT ENCOUNTER: Primary | ICD-10-CM

## 2020-05-21 DIAGNOSIS — S00.521D BLISTER OF LIP WITH INFECTION, SUBSEQUENT ENCOUNTER: Primary | ICD-10-CM

## 2020-05-21 PROCEDURE — 99213 PR OFFICE/OUTPT VISIT, EST, LEVL III, 20-29 MIN: ICD-10-PCS | Mod: S$GLB,,, | Performed by: FAMILY MEDICINE

## 2020-05-21 PROCEDURE — 3008F PR BODY MASS INDEX (BMI) DOCUMENTED: ICD-10-PCS | Mod: CPTII,S$GLB,, | Performed by: FAMILY MEDICINE

## 2020-05-21 PROCEDURE — 99999 PR PBB SHADOW E&M-EST. PATIENT-LVL III: CPT | Mod: PBBFAC,,, | Performed by: FAMILY MEDICINE

## 2020-05-21 PROCEDURE — 3008F BODY MASS INDEX DOCD: CPT | Mod: CPTII,S$GLB,, | Performed by: FAMILY MEDICINE

## 2020-05-21 PROCEDURE — 99213 OFFICE O/P EST LOW 20 MIN: CPT | Mod: S$GLB,,, | Performed by: FAMILY MEDICINE

## 2020-05-21 PROCEDURE — 99999 PR PBB SHADOW E&M-EST. PATIENT-LVL III: ICD-10-PCS | Mod: PBBFAC,,, | Performed by: FAMILY MEDICINE

## 2020-05-21 NOTE — PATIENT INSTRUCTIONS
Continue application of heat to lip for another 3 days or as long as drainage continues    Antibiotics another 5-7 days    Recheck or notify us if any worsening of symptoms or fever or enlargement of the infection area.

## 2020-05-21 NOTE — PROGRESS NOTES
Darshan Gonzalez  05/21/2020  [id number]    Nick Machuca MD  Patient Care Team:  Nick Machuca MD as PCP - General (Family Medicine)        Chief Complaint:  Chief Complaint   Patient presents with    Follow-up       History of Present Illness:   Darshan Gonzalez 34 y.o. male  with lower lip infection treated with Bactrim antibiotic and swelling worsened yesterday so he went to [facility] emergency room where incision and drainage was performed which released purulent material and he is feeling better now and continues to have some clear drainage.    No recent fever weight loss chills shortness of breath or chest pain but has noticed a little fatigued the last few days.  He states that IV in the emergency room helped because he had not been able to drink normal fluids for day or 2 due to the lip infection.  I talked to the patient's wife yesterday by phone and she stated he was doing better.    Today he is feeling much better and will continue his antibiotics for another 5-7 days and continue warm compress for the next few days.  He will let us know if he worsens in any way.  I do not have access to the lab results at present but per patient CBC and other labs were normal.  Patient states that the packing came out yesterday afternoon when he was cleaning the lip.      History:  No past medical history on file.  No past surgical history on file.  Family History   Problem Relation Age of Onset    Mitral valve prolapse Mother      Social History     Socioeconomic History    Marital status:      Spouse name: Not on file    Number of children: Not on file    Years of education: Not on file    Highest education level: Not on file   Occupational History    Not on file   Social Needs    Financial resource strain: Not on file    Food insecurity:     Worry: Not on file     Inability: Not on file    Transportation needs:     Medical: Not on file     Non-medical: Not on file   Tobacco Use     Smoking status: Never Smoker    Smokeless tobacco: Never Used   Substance and Sexual Activity    Alcohol use: Not Currently    Drug use: Not on file    Sexual activity: Not on file   Lifestyle    Physical activity:     Days per week: Not on file     Minutes per session: Not on file    Stress: Not on file   Relationships    Social connections:     Talks on phone: Not on file     Gets together: Not on file     Attends Jew service: Not on file     Active member of club or organization: Not on file     Attends meetings of clubs or organizations: Not on file     Relationship status: Not on file   Other Topics Concern    Not on file   Social History Narrative    Not on file     Patient Active Problem List   Diagnosis    Anxiety    Panic disorder    Erectile dysfunction    Blister of lip with infection     Review of patient's allergies indicates:   Allergen Reactions    Norflex [orphenadrine citrate]     Toradol [ketorolac]        The following were reviewed at this visit: active problem list, medication list, allergies, family history, social history, and health maintenance.    Medications:  Current Outpatient Medications on File Prior to Visit   Medication Sig Dispense Refill    predniSONE (DELTASONE) 10 MG tablet Three per day for 3 days, then 2 per day for 3 days, then 1 per day for 3 more days. 18 tablet 0    sulfamethoxazole-trimethoprim 800-160mg (BACTRIM DS) 800-160 mg Tab Take 2 tablets twice a day, total of 4 per day. 30 tablet 0    busPIRone (BUSPAR) 7.5 MG tablet One tab with food twice a day as needed for stress or anxiety. (Patient not taking: Reported on 5/21/2020) 30 tablet 2    escitalopram oxalate (LEXAPRO) 10 MG tablet Take 1 tablet (10 mg total) by mouth once daily. (Patient not taking: Reported on 5/21/2020) 30 tablet 11    ibuprofen (ADVIL,MOTRIN) 800 MG tablet Take 800 mg by mouth 3 (three) times daily.  0    tadalafil (CIALIS) 20 MG Tab Take 1 tablet (20 mg total) by mouth  daily as needed. (Patient not taking: Reported on 5/18/2020) 10 tablet 11    valACYclovir (VALTREX) 1000 MG tablet Take 1 tablet (1,000 mg total) by mouth 2 (two) times daily. (Patient not taking: Reported on 5/21/2020) 14 tablet 1     No current facility-administered medications on file prior to visit.        Medications have been reviewed and reconciled with patient at this visit.      Exam:  Wt Readings from Last 3 Encounters:   05/21/20 75.9 kg (167 lb 5.3 oz)   05/18/20 75.9 kg (167 lb 5.3 oz)   11/04/19 75.9 kg (167 lb 5.3 oz)     Temp Readings from Last 3 Encounters:   05/21/20 97.7 °F (36.5 °C) (Tympanic)   05/18/20 98.4 °F (36.9 °C) (Tympanic)   11/04/19 97.6 °F (36.4 °C) (Tympanic)     BP Readings from Last 3 Encounters:   05/21/20 114/80   05/18/20 110/80   11/04/19 110/66     Pulse Readings from Last 3 Encounters:   05/21/20 70   05/18/20 81   11/04/19 86     Body mass index is 24.71 kg/m².      Review of Systems   Constitutional: Negative for chills, fever and weight loss.   Respiratory: Negative for shortness of breath.    Cardiovascular: Negative for chest pain and palpitations.     Physical Exam  WNWD, A&O ambulatory  and in no acute distress.    Respiratory effort is normal    Lip exam-lower lip with 1+ edema but much improved from a few days ago    No expanding cellulitis noted    Lip is nontender to palpation there is a small pinpoint puncture on the inner aspect of the lower lip.    Psychiatric good affect cognition      Darshan was seen today for follow-up.    Diagnoses and all orders for this visit:    Blister of lip with infection, subsequent encounter        The patient verbalized good understanding of the medical issues discussed today and expressed appreciation for the care provided.  Patient was given the opportunity to ask questions and be an active participant in their medical care. Patient had no further questions or concerns at this time.   The patient was encouraged to participate in  appropriate physical activity.    After visit summary was printed and given to patient upon discharge today.  Patient goals and care plan are included in After Visit Summary.    This note was produced with voice recognition software and may have sound a like errors

## 2020-11-09 RX ORDER — TADALAFIL 20 MG/1
20 TABLET ORAL DAILY PRN
Qty: 10 TABLET | Refills: 11 | Status: SHIPPED | OUTPATIENT
Start: 2020-11-09 | End: 2021-11-08